# Patient Record
Sex: FEMALE | Race: BLACK OR AFRICAN AMERICAN | NOT HISPANIC OR LATINO | URBAN - METROPOLITAN AREA
[De-identification: names, ages, dates, MRNs, and addresses within clinical notes are randomized per-mention and may not be internally consistent; named-entity substitution may affect disease eponyms.]

---

## 2024-05-31 ENCOUNTER — OFFICE (OUTPATIENT)
Dept: URBAN - METROPOLITAN AREA CLINIC 19 | Facility: CLINIC | Age: 49
End: 2024-05-31

## 2024-05-31 VITALS
SYSTOLIC BLOOD PRESSURE: 125 MMHG | OXYGEN SATURATION: 99 % | WEIGHT: 281 LBS | HEART RATE: 88 BPM | HEIGHT: 67 IN | DIASTOLIC BLOOD PRESSURE: 68 MMHG

## 2024-05-31 DIAGNOSIS — Z79.1 LONG TERM (CURRENT) USE OF NON-STEROIDAL ANTI-INFLAMMATORIES: ICD-10-CM

## 2024-05-31 DIAGNOSIS — K59.04 CHRONIC IDIOPATHIC CONSTIPATION: ICD-10-CM

## 2024-05-31 DIAGNOSIS — E66.01 MORBID (SEVERE) OBESITY DUE TO EXCESS CALORIES: ICD-10-CM

## 2024-05-31 DIAGNOSIS — R10.13 EPIGASTRIC PAIN: ICD-10-CM

## 2024-05-31 DIAGNOSIS — K21.9 GASTRO-ESOPHAGEAL REFLUX DISEASE WITHOUT ESOPHAGITIS: ICD-10-CM

## 2024-05-31 PROCEDURE — 99204 OFFICE O/P NEW MOD 45 MIN: CPT

## 2024-05-31 RX ORDER — POLYETHYLENE GLYCOL 3350, SODIUM SULFATE, SODIUM CHLORIDE, POTASSIUM CHLORIDE, ASCORBIC ACID, SODIUM ASCORBATE 140-9-5.2G
KIT ORAL
Qty: 1 | Refills: 0 | Status: ACTIVE
Start: 2024-05-31

## 2024-05-31 RX ORDER — LANSOPRAZOLE 30 MG/1
CAPSULE, DELAYED RELEASE PELLETS ORAL
Qty: 30 | Refills: 5 | Status: ACTIVE
Start: 2024-05-31

## 2024-05-31 NOTE — SERVICEHPINOTES
49-year-old female is here with complaints of epigastric abdominal pain.  Pain seems to be exacerbated after she eats.  Takes ibuprofen multiple times per week.  She does have her gallbladder.  Complaining of uncontrolled acid reflux.  Not taking any antacids at this time.  Drinks 1 carbonated energy drink daily.  Denies any nocturnal reflux symptoms.  Denies nausea or vomiting or trouble swallowing.  She is 3 bowel movements per week.  Does not vape or smoke marijuana.   Smokes 10-12 cigarettes daily. History of complete hysterectomy 10 years ago. She is tried fiber supplements, milk of magnesia, and magnesium citrate which has not improved her bowel movements.  Denies melena or hematochezia or hematemesis.  Denies any abnormal weight loss.  Mother with ulcerative colitis.  No family history of colon cancer or colon polyps.  Taking Mounjaro injections weekly.  She was previously on Trulicity for several years.  History of diabetes and states her diabetes is well controlled.  No cardiac issues and not taking any blood thinners.  She has never had an EGD or colonoscopy.  Her insurance runs out on 06/28/2024.  She will try to get on her 's insurance after that time.

## 2024-06-18 ENCOUNTER — OFFICE (OUTPATIENT)
Dept: URBAN - METROPOLITAN AREA CLINIC 19 | Facility: CLINIC | Age: 49
End: 2024-06-18

## 2024-06-18 DIAGNOSIS — R10.13 EPIGASTRIC PAIN: ICD-10-CM

## 2024-06-18 PROCEDURE — 76700 US EXAM ABDOM COMPLETE: CPT | Mod: TC
